# Patient Record
Sex: MALE | Race: BLACK OR AFRICAN AMERICAN | Employment: FULL TIME | URBAN - METROPOLITAN AREA
[De-identification: names, ages, dates, MRNs, and addresses within clinical notes are randomized per-mention and may not be internally consistent; named-entity substitution may affect disease eponyms.]

---

## 2020-05-19 ENCOUNTER — HOSPITAL ENCOUNTER (EMERGENCY)
Age: 35
Discharge: HOME | End: 2020-05-19
Attending: EMERGENCY MEDICINE

## 2020-05-19 VITALS
OXYGEN SATURATION: 100 % | BODY MASS INDEX: 34.36 KG/M2 | HEIGHT: 70 IN | TEMPERATURE: 98.4 F | WEIGHT: 240 LBS | HEART RATE: 86 BPM | RESPIRATION RATE: 15 BRPM | SYSTOLIC BLOOD PRESSURE: 178 MMHG | DIASTOLIC BLOOD PRESSURE: 125 MMHG

## 2020-05-19 PROCEDURE — 99281 EMR DPT VST MAYX REQ PHY/QHP: CPT

## 2020-05-20 ASSESSMENT — ENCOUNTER SYMPTOMS
CHEST TIGHTNESS: 0
ABDOMINAL PAIN: 0
VOMITING: 0
TROUBLE SWALLOWING: 0
WHEEZING: 0
EYE PAIN: 0
DIARRHEA: 0
RHINORRHEA: 0
NAUSEA: 0
SHORTNESS OF BREATH: 0
COLOR CHANGE: 0
ABDOMINAL DISTENTION: 0
SORE THROAT: 0
COUGH: 0

## 2020-05-20 NOTE — ED TRIAGE NOTES
Pt states that he was told that he had a fever this morning at work, and to be evaluated by a physician.  Pt denies any additional symptoms and is afebrile on arrival to ED
Statement Selected

## 2020-05-20 NOTE — ED NOTES
Bed: A09-09  Expected date:   Expected time:   Means of arrival:   Comments:  resp     Shailesh Salazar RN  05/19/20 2021